# Patient Record
Sex: FEMALE | Race: WHITE | Employment: PART TIME | URBAN - METROPOLITAN AREA
[De-identification: names, ages, dates, MRNs, and addresses within clinical notes are randomized per-mention and may not be internally consistent; named-entity substitution may affect disease eponyms.]

---

## 2021-03-15 ENCOUNTER — OFFICE VISIT (OUTPATIENT)
Dept: ENDOCRINOLOGY | Age: 41
End: 2021-03-15
Payer: COMMERCIAL

## 2021-03-15 VITALS
BODY MASS INDEX: 45.54 KG/M2 | SYSTOLIC BLOOD PRESSURE: 140 MMHG | RESPIRATION RATE: 14 BRPM | HEIGHT: 63 IN | DIASTOLIC BLOOD PRESSURE: 93 MMHG | HEART RATE: 73 BPM | OXYGEN SATURATION: 97 % | WEIGHT: 257 LBS

## 2021-03-15 DIAGNOSIS — E88.81 INSULIN RESISTANCE: ICD-10-CM

## 2021-03-15 DIAGNOSIS — R53.83 OTHER FATIGUE: ICD-10-CM

## 2021-03-15 DIAGNOSIS — R63.5 WEIGHT GAIN, ABNORMAL: ICD-10-CM

## 2021-03-15 DIAGNOSIS — E28.8 HYPERANDROGENISM: ICD-10-CM

## 2021-03-15 DIAGNOSIS — E04.9 GOITER: ICD-10-CM

## 2021-03-15 DIAGNOSIS — Z86.32 HISTORY OF GESTATIONAL DIABETES: ICD-10-CM

## 2021-03-15 PROBLEM — E88.819 INSULIN RESISTANCE: Status: ACTIVE | Noted: 2021-03-15

## 2021-03-15 PROBLEM — O24.419 GESTATIONAL DIABETES: Status: ACTIVE | Noted: 2021-03-15

## 2021-03-15 PROCEDURE — 99204 OFFICE O/P NEW MOD 45 MIN: CPT | Performed by: INTERNAL MEDICINE

## 2021-03-15 NOTE — PROGRESS NOTES
Ricki Cogan is a 47 y.o. female who is being evaluated for Type 2 diabetes mellitus. Current symptoms/problems include {Symptoms; diabetes:85811::\"none\"} and {Improving/worsening/no change:89499}. 1. No primary diagnosis found. Diagnosed with Type 2 diabetes mellitus in ***. Comorbid conditions: {Blank multiple:83421::\"Neuropathy\",\"Nephropathy\",\"Retinopathy\",\"Chronic Kidney Disease\",\"Gastroparesis\",\"Coronary Artery Disease\"}    Current diabetic medications include: ***    Intolerance to diabetes medications: {YES / NO:61276}     Weight trend: stable  Prior visit with dietician: yes  Current diet: on average, 3 meals per day  Current exercise: {Rare/occasional/frequent:07603}     Current monitoring regimen: home blood tests - {1-4:182042273} times daily  Has brought blood glucose log/meter:  {YES / X}  Home blood sugar records: fasting range: *** and postprandial range: ***   Any episodes of hypoglycemia? {YES / XL:30669}  Hypoglycemia frequency and time(s):  ***  Does patient have Glucagon emergency kit? {YES / KD:74591}  Does patient have rapid acting carbohydrate? {YES / LY:36786}  Hemoglobin A1c 6.5 in 2020  TSH 1.29  Hemoglobin A1c 6.0 in 2020  Does patient wear a medic alert bracelet or necklace? {YES / RZ:03991}      There are no diagnoses linked to this encounter. No past medical history on file. There is no problem list on file for this patient. No past surgical history on file.   Social History     Socioeconomic History    Marital status:      Spouse name: Not on file    Number of children: Not on file    Years of education: Not on file    Highest education level: Not on file   Occupational History    Not on file   Social Needs    Financial resource strain: Not on file    Food insecurity     Worry: Not on file     Inability: Not on file    Transportation needs     Medical: Not on file     Non-medical: Not on file   Tobacco Use    Smoking status: Not on file   Substance and Sexual Activity    Alcohol use: Not on file    Drug use: Not on file    Sexual activity: Not on file   Lifestyle    Physical activity     Days per week: Not on file     Minutes per session: Not on file    Stress: Not on file   Relationships    Social connections     Talks on phone: Not on file     Gets together: Not on file     Attends Orthodoxy service: Not on file     Active member of club or organization: Not on file     Attends meetings of clubs or organizations: Not on file     Relationship status: Not on file    Intimate partner violence     Fear of current or ex partner: Not on file     Emotionally abused: Not on file     Physically abused: Not on file     Forced sexual activity: Not on file   Other Topics Concern    Not on file   Social History Narrative    Not on file     No family history on file. No current outpatient medications on file. No current facility-administered medications for this visit. Not on File  No family status information on file.        Lab Review:    No results found for: WBC, HGB, HCT, MCV, PLT  No results found for: NA, K, CL, CO2, BUN, CREATININE, GLUCOSE, CALCIUM, PROT, LABALBU, BILITOT, ALKPHOS, AST, ALT, LABGLOM, GFRAA, AGRATIO, GLOB  No results found for: TSHFT4, TSH, FT3  No results found for: LABA1C  No results found for: EAG  No results found for: CHOL  No results found for: TRIG  No results found for: HDL  No results found for: LDLCHOLESTEROL, LDLCALC  No results found for: LABVLDL, VLDL  No results found for: CHOLHDLRATIO  No results found for: LABMICR, SDGL15PSO  No results found for: VITD25     Review of Systems:  Constitutional: no fatigue, no fever, no recent weight gain, no recent weight loss, no changes in appetite  Eyes: no eye pain, no change in vision, no eye redness, no eye irritation, no double vision  Ears, nose, throat: no nasal congestion, no sore throat, no earache, no decrease in hearing, no hoarseness, no dry mouth, no sinus problems, no difficulty swallowing, no neck lumps, no dental problems, no mouth sores, no ringing in ears  Pulmonary: no shortness of breath, no wheezing, no dyspnea on exertion, no cough  Cardiovascular: no chest pain, no lower extremity edema, no orthopnea, no intermittent leg claudication, no palpitations  Gastrointestinal: no abdominal pain, no nausea, no vomiting, no diarrhea, no constipation, no dysphagia, no heartburn, no bloating  Genitourinary: no dysuria, no urinary incontinence, no urinary hesitancy, no urinary frequency, no feelings of urinary urgency, no nocturia  Musculoskeletal: no joint swelling, no joint stiffness, no joint pain, no muscle cramps, no muscle pain, no bone pain  Integument/Breast: no hair loss, no skin rashes, no skin lesions, no itching, no dry skin  Neurological: no numbness, no tingling, no weakness, no confusion, no headaches, no dizziness, no fainting, no tremors, no decrease in memory, no balance problems  Psychiatric: no anxiety, no depression, no insomnia  Hematologic/Lymphatic: no tendency for easy bleeding, no swollen lymph nodes, no tendency for easy bruising  Immunology: no seasonal allergies, no frequent infections, no frequent illnesses  Endocrine: no temperature intolerance    There were no vitals taken for this visit. Wt Readings from Last 3 Encounters:   No data found for Wt     There is no height or weight on file to calculate BMI.       OBJECTIVE:  Constitutional: no acute distress, well appearing and well nourished  Psychiatric: oriented to person, place and time, judgement and insight and normal, recent and remote memory and intact and mood and affect are normal  Skin: skin and subcutaneous tissue is normal without mass, normal turgor  Head and Face: examination of head and face revealed no abnormalities  Eyes: no lid or conjunctival swelling, erythema or discharge, pupils are normal, equal, round, reactive to light  Ears/Nose: external inspection {YES/NO:19726}  Obtained history from other than patient {YES/NO:19726}    Grace Schneider was counseled regarding symptoms of current diagnosis, course and complications of disease if inadequately treated, side effects of medications, diagnosis, treatment options, and prognosis, risks, benefits, complications, and alternatives of treatment, labs, imaging and other studies and treatment targets and goals. She understands instructions and counseling. These diagnosis were discussed and reviewed with the patient including the advantages of drug therapy. She was counseled at this visit on the following: diabetes complication prevention and foot care. No follow-ups on file.     Electronically signed by Brandy Heart MD on 3/15/2021 at 12:03 AM

## 2021-03-15 NOTE — PROGRESS NOTES
SUBJECTIVE:  Ludmila Guidry is a 47 y.o. female who is being evaluated for thyroid disease and gestational diabetes. 1. Other fatigue  This started in 2008. Patient was diagnosed with fatigue. The problem has been gradually worsening. Previous thyroid studies include: TSH and free thyroxine. Patient started medication in N/A. Currently patient is on: N/A. Misses  N/A doses a month. Current complaints: fatigue, flaky nails, has cold hands and feet, cold intolerance, heavy periods, difficulty bending knees, fluid in front of knees, difficulty loosing weight, brain fog, weight mostly in stomach, depression, constipation and diarrhea, dry skin, hair loss, sleepiness  Fatigue severe all the time    2. History of gestational diabetes  Has 2 babies, 14 mo boy, 5 mo girl, 12 and 15 yo boy and girl  Second 5 months ago  Had gestational diabetes during pregnancy    3. Goiter  History of obstructive symptoms: difficulty swallowing Yes, something is cougt in the throat, throat lcearingchanges in voice/hoarseness No.  History of radiation to patient's neck: No  Resent iodine exposure: No  Family history includes no thyroid abnormalities. Family history of thyroid cancer: No    4. Weight gain, abnormal  Weight gain since 2018 40 lbs what is left after babies  Eating healthy  Exercises  Has facial plethora  Has dark pink stria, wide stria    5. Hyperandrogenism  Has hair loss  Periods are heavy  Regular  No hirsutism  Not breastfeeding    6. Insulin resistance  Has difficulty losing weight      History reviewed. No pertinent past medical history.   Patient Active Problem List    Diagnosis Date Noted    Other fatigue 03/15/2021    Weight gain, abnormal 03/15/2021    Hyperandrogenism 03/15/2021    Goiter 03/15/2021    Gestational diabetes 03/15/2021    History of gestational diabetes 03/15/2021    Insulin resistance 03/15/2021     Past Surgical History:   Procedure Laterality Date    CHOLECYSTECTOMY      TUBAL LIGATION      WISDOM TOOTH EXTRACTION       Family History   Problem Relation Age of Onset    Diabetes type 2  Mother     High Blood Pressure Father      Social History     Socioeconomic History    Marital status:      Spouse name: None    Number of children: None    Years of education: None    Highest education level: None   Occupational History    None   Social Needs    Financial resource strain: None    Food insecurity     Worry: None     Inability: None    Transportation needs     Medical: None     Non-medical: None   Tobacco Use    Smoking status: Never Smoker    Smokeless tobacco: Never Used   Substance and Sexual Activity    Alcohol use: Not Currently    Drug use: Never    Sexual activity: None   Lifestyle    Physical activity     Days per week: None     Minutes per session: None    Stress: None   Relationships    Social connections     Talks on phone: None     Gets together: None     Attends [employer] service: None     Active member of club or organization: None     Attends meetings of clubs or organizations: None     Relationship status: None    Intimate partner violence     Fear of current or ex partner: None     Emotionally abused: None     Physically abused: None     Forced sexual activity: None   Other Topics Concern    None   Social History Narrative    None     No current outpatient medications on file. No current facility-administered medications for this visit.       Allergies   Allergen Reactions    Penicillin G Hives     Childhood allergy       Family Status   Relation Name Status    Mother  (Not Specified)    Father  (Not Specified)       Review of Systems:  Constitutional: no fatigue, no fever, no recent weight gain, no recent weight loss, no changes in appetite  Eyes: no eye pain, no change in vision, no eye redness, no eye irritation, no double vision  Ears, nose, throat: no nasal congestion, no sore throat, no earache, no decrease in hearing, no hoarseness, no dry mouth, no sinus problems, no difficulty swallowing, no neck lumps, no dental problems, no mouth sores, no ringing in ears  Pulmonary: no shortness of breath, no wheezing, no dyspnea on exertion, no cough  Cardiovascular: no chest pain, no lower extremity edema, no orthopnea, no intermittent leg claudication, no palpitations  Gastrointestinal: no abdominal pain, no nausea, no vomiting, no diarrhea, no constipation, no dysphagia, no heartburn, no bloating  Genitourinary: no dysuria, no urinary incontinence, no urinary hesitancy, no urinary frequency, no feelings of urinary urgency, no nocturia  Musculoskeletal: no joint swelling, no joint stiffness, no joint pain, no muscle cramps, no muscle pain, no bone pain  Integument/Breast: no hair loss, no skin rashes, no skin lesions, no itching, no dry skin  Neurological: no numbness, no tingling, no weakness, no confusion, no headaches, no dizziness, no fainting, no tremors, no decrease in memory, no balance problems  Psychiatric: no anxiety, no depression, no insomnia  Hematologic/Lymphatic: no tendency for easy bleeding, no swollen lymph nodes, no tendency for easy bruising  Immunology: no seasonal allergies, no frequent infections, no frequent illnesses  Endocrine: no temperature intolerance    BP (!) 140/93   Pulse 73   Resp 14   Ht 5' 3\" (1.6 m)   Wt 257 lb (116.6 kg)   LMP 03/08/2021   SpO2 97%   BMI 45.53 kg/m²    Wt Readings from Last 3 Encounters:   03/15/21 257 lb (116.6 kg)     Body mass index is 45.53 kg/m².     OBJECTIVE:  Constitutional: no acute distress, well appearing and well nourished  Psychiatric: oriented to person, place and time, judgement and insight and normal, recent and remote memory and intact and mood and affect are normal  Skin: skin and subcutaneous tissue is normal without mass, normal turgor  Head and Face: examination of head and face revealed no abnormalities  Eyes: no lid or conjunctival swelling, erythema or discharge, pupils are normal, equal, round, reactive to light  Ears/Nose: external inspection of ears and nose revealed no abnormalities, hearing is grossly normal  Oropharynx/Mouth/Face: lips, tongue and gums are normal with no lesions, the voice quality was normal  Neck: neck is supple and symmetric, with midline trachea and no masses, thyroid is enlarged  Lymphatics: normal cervical lymph nodes, normal supraclavicular nodes  Pulmonary: no increased work of breathing or signs of respiratory distress, lungs are clear to auscultation  Cardiovascular: normal heart rate and rhythm, normal S1 and S2, no murmurs and pedal pulses and 2+ bilaterally, No edema  Abdomen: abdomen is soft, non-tender with no masses  Musculoskeletal: normal gait and station and exam of the digits and nails are normal  Neurological: normal coordination and normal general cortical function      Lab Review:    Lab Results   Component Value Date    WBC 6.8 03/16/2021    HGB 14.5 03/16/2021    HCT 44.4 03/16/2021    MCV 85.0 03/16/2021     03/16/2021     Lab Results   Component Value Date     03/16/2021    K 4.2 03/16/2021    CL 99 03/16/2021    CO2 24 03/16/2021    BUN 11 03/16/2021    CREATININE <0.5 03/16/2021    GLUCOSE 107 03/16/2021    CALCIUM 9.1 03/16/2021    PROT 7.8 03/16/2021    LABALBU 4.6 03/16/2021    BILITOT 0.5 03/16/2021    ALKPHOS 126 03/16/2021    AST 15 03/16/2021    ALT 20 03/16/2021    LABGLOM >60 03/16/2021    GFRAA >60 03/16/2021    AGRATIO 1.4 03/16/2021    GLOB 3.2 03/16/2021     Lab Results   Component Value Date    TSH 1.26 03/16/2021    FT3 3.4 03/16/2021     Lab Results   Component Value Date    LABA1C 6.1 03/16/2021     Lab Results   Component Value Date    .4 03/16/2021     No results found for: CHOL  No results found for: TRIG  No results found for: HDL  No results found for: LDLCHOLESTEROL, LDLCALC  No results found for: LABVLDL, VLDL  No results found for: CHOLHDLRATIO  No results found for: Chris Patrick No results found for: VITD25     ASSESSMENT/PLAN:  1. Other fatigue  Obtain lab work, then reevaluate  - Hemoglobin A1C; Future  - Insulin, total; Future  - C-Peptide; Future  - Comprehensive Metabolic Panel; Future  - CBC Auto Differential; Future  - T3, Free; Future  - T4, Free; Future  - TSH without Reflex; Future  - Thyroid Peroxidase Antibody; Future  - Anti-Thyroglobulin Antibody; Future  - ACTH; Future  - Cortisol AM, Total; Future    2. Weight gain, abnormal  Diet and exercise  - Insulin, total; Future  - Comprehensive Metabolic Panel; Future  - T3, Free; Future  - T4, Free; Future  - TSH without Reflex; Future  - ACTH; Future  - Cortisol AM, Total; Future    3. Hyperandrogenism    - Insulin, total; Future  - Comprehensive Metabolic Panel; Future  - T3, Free; Future  - T4, Free; Future  - TSH without Reflex; Future  - ACTH; Future  - Cortisol AM, Total; Future    4. Goiter    - T3, Free; Future  - T4, Free; Future  - TSH without Reflex; Future  - Thyroid Peroxidase Antibody; Future  - Anti-Thyroglobulin Antibody; Future  - US HEAD NECK SOFT TISSUE THYROID; Future    5. History of gestational diabetes    - Hemoglobin A1C; Future  - Insulin, total; Future  - C-Peptide; Future  - Comprehensive Metabolic Panel; Future    6. Insulin resistance  Consider Metformin  - Hemoglobin A1C; Future  - Insulin, total; Future  - C-Peptide; Future  - Comprehensive Metabolic Panel;  Future      Reviewed and/or ordered clinical lab results Yes  Reviewed and/or ordered radiology tests Yes   Reviewed and/or ordered other diagnostic tests No  Discussed test results with performing physician No  Independently reviewed image, tracing, or specimen No  Made a decision to obtain old records No  Reviewed and summarized old records Yes  Obtained history from other than patient No    Lazaro Woodward was counseled regarding symptoms of fatigue, insulin resistance, hyperandrogenism, goiter diagnosis, course and complications of disease if inadequately treated, side effects of medications, diagnosis, treatment options, and prognosis, risks, benefits, complications, and alternatives of treatment, labs, imaging and other studies and treatment targets and goals. She understands instructions and counseling. Total time I spent for this encounter 45 minutes    Return in about 1 month (around 4/15/2021) for thyroid problems.     Electronically signed by Lore Mcfarlane MD on 3/21/2021 at 7:54 PM

## 2021-03-16 ENCOUNTER — HOSPITAL ENCOUNTER (OUTPATIENT)
Dept: ULTRASOUND IMAGING | Age: 41
Discharge: HOME | End: 2021-03-16
Payer: COMMERCIAL

## 2021-03-16 DIAGNOSIS — E04.9 GOITER: ICD-10-CM

## 2021-03-16 DIAGNOSIS — E88.819 INSULIN RESISTANCE: ICD-10-CM

## 2021-03-16 DIAGNOSIS — R53.83 OTHER FATIGUE: ICD-10-CM

## 2021-03-16 DIAGNOSIS — R63.5 WEIGHT GAIN, ABNORMAL: ICD-10-CM

## 2021-03-16 DIAGNOSIS — Z86.32 HISTORY OF GESTATIONAL DIABETES: ICD-10-CM

## 2021-03-16 DIAGNOSIS — E28.8 HYPERANDROGENISM: ICD-10-CM

## 2021-03-16 LAB
A/G RATIO: 1.4 (ref 1.1–2.2)
ALBUMIN SERPL-MCNC: 4.6 G/DL (ref 3.4–5)
ALP BLD-CCNC: 126 U/L (ref 40–129)
ALT SERPL-CCNC: 20 U/L (ref 10–40)
ANION GAP SERPL CALCULATED.3IONS-SCNC: 13 MMOL/L (ref 3–16)
ANTI-THYROGLOB ABS: 12 IU/ML
AST SERPL-CCNC: 15 U/L (ref 15–37)
BASOPHILS ABSOLUTE: 0 K/UL (ref 0–0.2)
BASOPHILS RELATIVE PERCENT: 0.7 %
BILIRUB SERPL-MCNC: 0.5 MG/DL (ref 0–1)
BUN BLDV-MCNC: 11 MG/DL (ref 7–20)
CALCIUM SERPL-MCNC: 9.1 MG/DL (ref 8.3–10.6)
CHLORIDE BLD-SCNC: 99 MMOL/L (ref 99–110)
CO2: 24 MMOL/L (ref 21–32)
CORTISOL - AM: 11.9 UG/DL (ref 4.3–22.4)
CREAT SERPL-MCNC: <0.5 MG/DL (ref 0.6–1.1)
EOSINOPHILS ABSOLUTE: 0.3 K/UL (ref 0–0.6)
EOSINOPHILS RELATIVE PERCENT: 4.9 %
GFR AFRICAN AMERICAN: >60
GFR NON-AFRICAN AMERICAN: >60
GLOBULIN: 3.2 G/DL
GLUCOSE BLD-MCNC: 107 MG/DL (ref 70–99)
HCT VFR BLD CALC: 44.4 % (ref 36–48)
HEMOGLOBIN: 14.5 G/DL (ref 12–16)
LYMPHOCYTES ABSOLUTE: 2.3 K/UL (ref 1–5.1)
LYMPHOCYTES RELATIVE PERCENT: 34.5 %
MCH RBC QN AUTO: 27.8 PG (ref 26–34)
MCHC RBC AUTO-ENTMCNC: 32.7 G/DL (ref 31–36)
MCV RBC AUTO: 85 FL (ref 80–100)
MONOCYTES ABSOLUTE: 0.3 K/UL (ref 0–1.3)
MONOCYTES RELATIVE PERCENT: 4.7 %
NEUTROPHILS ABSOLUTE: 3.7 K/UL (ref 1.7–7.7)
NEUTROPHILS RELATIVE PERCENT: 55.2 %
PDW BLD-RTO: 14.3 % (ref 12.4–15.4)
PLATELET # BLD: 334 K/UL (ref 135–450)
PMV BLD AUTO: 8.7 FL (ref 5–10.5)
POTASSIUM SERPL-SCNC: 4.2 MMOL/L (ref 3.5–5.1)
RBC # BLD: 5.22 M/UL (ref 4–5.2)
SODIUM BLD-SCNC: 136 MMOL/L (ref 136–145)
T3 FREE: 3.4 PG/ML (ref 2.3–4.2)
T4 FREE: 1.2 NG/DL (ref 0.9–1.8)
THYROID PEROXIDASE (TPO) ABS: 11 IU/ML
TOTAL PROTEIN: 7.8 G/DL (ref 6.4–8.2)
TSH SERPL DL<=0.05 MIU/L-ACNC: 1.26 UIU/ML (ref 0.27–4.2)
WBC # BLD: 6.8 K/UL (ref 4–11)

## 2021-03-16 PROCEDURE — 76536 US EXAM OF HEAD AND NECK: CPT

## 2021-03-17 LAB
ADRENOCORTICOTROPIC HORMONE: 15 PG/ML (ref 6–58)
C-PEPTIDE: 3.7 NG/ML (ref 1.1–4.4)
ESTIMATED AVERAGE GLUCOSE: 128.4 MG/DL
HBA1C MFR BLD: 6.1 %
INSULIN COMMENT: NORMAL
INSULIN REFERENCE RANGE:: NORMAL
INSULIN: 16.3 MU/L

## 2021-03-24 ENCOUNTER — TELEPHONE (OUTPATIENT)
Dept: ENDOCRINOLOGY | Age: 41
End: 2021-03-24

## 2021-03-24 DIAGNOSIS — E28.8 HYPERANDROGENISM: Primary | ICD-10-CM

## 2021-03-24 DIAGNOSIS — R63.5 WEIGHT GAIN, ABNORMAL: ICD-10-CM

## 2021-03-25 NOTE — TELEPHONE ENCOUNTER
Spoke with patient. Discussed results of thyroid ultrasound and lab work. Thyroid ultrasound revealed left 9 mm and isthmus 11 mm nodules. We will repeat thyroid ultrasound in 6 months. Left lymph node patient states that it was there for a while. It appears benign on the ultrasound. Thyroid test was good, cortisol was good. Test is consistent with insulin resistance and prediabetes. Because of abnormal weight gain, will obtain 3 samples of salivary cortisol at midnight.